# Patient Record
Sex: MALE | Race: WHITE | NOT HISPANIC OR LATINO | Employment: OTHER | URBAN - METROPOLITAN AREA
[De-identification: names, ages, dates, MRNs, and addresses within clinical notes are randomized per-mention and may not be internally consistent; named-entity substitution may affect disease eponyms.]

---

## 2020-02-07 NOTE — PROGRESS NOTES
Subjective:        [facility]  Cardiology Consult Note    Patient Identification:  Name: Ector Espino  Age: 59 y.o.  Sex: male  :  1960  MRN: [id number]             CC  AFIB    RECENT HOSP FOR [facility]OL[facility]    History of Present Illness:   59-year-old male who was recently admitted to the [facility] where the patient was admitted because of the alcohol intoxication, also was found to have the atrial fibrillation echocardiogram was performed, anticoagulations were offered but the patient denied here for the follow-up with no complaints of chest pain shortness of breath on exertion of a moderate degree at moderate exertion    Comorbid cardiac risk factors:     Past Medical History:  Past Medical History:   Diagnosis Date   • Atrial fibrillation (CMS/HCC)    • COPD (chronic obstructive pulmonary disease) (CMS/HCC)    • Hypertension      Past Surgical History:  History reviewed. No pertinent surgical history.   Allergies:  No Known Allergies  Home Meds:    (Not in a hospital admission)  Current Meds:   [unfilled]  Social History:   Social History     Tobacco Use   • Smoking status: Current Every Day Smoker   Substance Use Topics   • Alcohol use: Not Currently     Comment: quit 1 month ago      Family History:  History reviewed. No pertinent family history.     Review of Systems    Constitutional: No weakness,fatigue, fever, rigors, chills   Eyes: No vision changes, eye pain   ENT/oropharynx: No difficulty swallowing, sore throat, epistaxis, changes in hearing   Cardiovascular: No chest pain, chest tightness, palpitations, paroxysmal nocturnal dyspnea, orthopnea, diaphoresis, dizziness / syncopal episode   Respiratory:  Moderate shortness of breath, dyspnea on exertion, cough, wheezing hemoptysis   Gastrointestinal: No abdominal pain, nausea, vomiting, diarrhea, bloody stools   Genitourinary: No hematuria, dysuria   Neurological: No headache, tremors, numbness,  one-sided weakness     Musculoskeletal: No cramps, myalgias,  joint pain, joint swelling   Integument: No rash, edema           Constitutional:  Heart Rate:  [64] 64  BP: (131)/(91) 131/91    Physical Exam   General:  Appears in no acute distress  Eyes: PERTL,  HEENT:  No JVD. Thyroid not visibly enlarged. No mucosal pallor or cyanosis  Respiratory: Respirations regular and unlabored at rest. BBS with good air entry in all fields. No crackles, rubs or wheezes auscultated  Cardiovascular: S1S2 Regular rate and rhythm. No murmur, rub or gallop auscultated. No carotid bruits. DP/PT pulses    . No pretibial pitting edema  Gastrointestinal: Abdomen soft, flat, non tender. Bowel sounds present. No hepatosplenomegaly. No ascites  Musculoskeletal: FONTANA x4. No abnormal movements  Extremities: No digital clubbing or cyanosis  Skin: Color pink. Skin warm and dry to touch. No rashes  No xanthoma  Neuro: AAO x3 CN II-XII grossly intact            ECG 12 Lead  Date/Time: 2/13/2020 12:43 PM  Performed by: Kayce Cano MD  Authorized by: Kayce Cano MD   Comparison: not compared with previous ECG   Previous ECG: no previous ECG available  Rhythm: atrial fibrillation  ST Flattening: all    Clinical impression: non-specific ECG                Cardiographics  ECG:     Telemetry:    Echocardiogram:     Imaging  Chest X-ray:     Lab Review               @[email or web address]@            Summary   Technically difficult examination.   The ejection fraction 4 chamber is calculated at 55%.   Global left ventricular wall motion and contractility are within normal   limits.   Normal diastolic filling pattern.   Mild concentric left ventricular hypertrophy observed.   Mild dilation of the ascending aorta (3.9cm).   Trace tricuspid regurgitation.  Assessment:/ Recommendations / Plan:   Patient Active Problem List   Diagnosis   • Syncope and collapse   • Atrial fibrillation (CMS/HCC)   • Shortness of breath                    ICD-10-CM ICD-9-CM   1.  Syncope and collapse R55 780.2   2. Atrial fibrillation, unspecified type (CMS/HCC) I48.91 427.31   3. Shortness of breath R06.02 786.05     1. Syncope and collapse  Considering the patient's symptoms as well as clinical situation and  EKG findings, along with cardiac risk factors, ischemic workup is necessary to rule out ischemic cardiomyopathy, stress induced arrhythmias, and functional capacity for diagnosis as well as prognostic consideration    - Stress Test With Myocardial Perfusion One Day    2. Atrial fibrillation, unspecified type (CMS/HCC)  New onset of atrial fibrillation with a controlled rate patient does not want anticoagulation rate is under control will need ischemic work-up  - Stress Test With Myocardial Perfusion One Day    3. Shortness of breath  Probably due to multifactorial including atrial fibrillation and lack of exercise            CHADS-VASc Risk Assessment            1       Total Score        1 Hypertension        Criteria that do not apply:    CHF    Age >/= 75    DM    PRIOR STROKE/TIA/THROMBO    Vascular Disease    Age 65-74    Sex: Female        PT STILL DOES NOT WANT ANTICOAGULATIONS , DUE TO SIDE EFFECTS AND COST     JUST COMING OUT OF ALCIHOLISM    [facility]TH  Labs/tests ordered for am      Kayce Cano MD  2/13/2020, 12:38 PM      EMR Dragon/Transcription:   Dictated utilizing Dragon dictation

## 2020-02-13 ENCOUNTER — OFFICE VISIT (OUTPATIENT)
Dept: CARDIOLOGY | Facility: CLINIC | Age: 60
End: 2020-02-13

## 2020-02-13 VITALS
BODY MASS INDEX: 30.64 KG/M2 | HEART RATE: 64 BPM | DIASTOLIC BLOOD PRESSURE: 91 MMHG | HEIGHT: 70 IN | WEIGHT: 214 LBS | SYSTOLIC BLOOD PRESSURE: 131 MMHG

## 2020-02-13 DIAGNOSIS — I48.91 ATRIAL FIBRILLATION, UNSPECIFIED TYPE (HCC): ICD-10-CM

## 2020-02-13 DIAGNOSIS — R55 SYNCOPE AND COLLAPSE: Primary | ICD-10-CM

## 2020-02-13 DIAGNOSIS — R06.02 SHORTNESS OF BREATH: ICD-10-CM

## 2020-02-13 DIAGNOSIS — R94.39 ABNORMAL STRESS TEST: ICD-10-CM

## 2020-02-13 PROCEDURE — 99203 OFFICE O/P NEW LOW 30 MIN: CPT | Performed by: INTERNAL MEDICINE

## 2020-02-13 PROCEDURE — 93000 ELECTROCARDIOGRAM COMPLETE: CPT | Performed by: INTERNAL MEDICINE

## 2020-02-13 RX ORDER — MELOXICAM 15 MG/1
15 TABLET ORAL DAILY
COMMUNITY
Start: 2020-02-11

## 2020-02-13 RX ORDER — NICOTINE 21 MG/24HR
1 PATCH, TRANSDERMAL 24 HOURS TRANSDERMAL EVERY 24 HOURS
COMMUNITY
Start: 2020-01-29 | End: 2020-04-16

## 2020-02-13 RX ORDER — ASPIRIN 325 MG
325 TABLET, DELAYED RELEASE (ENTERIC COATED) ORAL DAILY
COMMUNITY
Start: 2018-02-23

## 2020-02-13 RX ORDER — FUROSEMIDE 40 MG/1
40 TABLET ORAL DAILY
COMMUNITY
Start: 2020-01-27 | End: 2020-04-16

## 2020-02-13 RX ORDER — ATENOLOL 25 MG/1
TABLET ORAL
COMMUNITY
Start: 2020-01-13

## 2020-02-13 RX ORDER — LEVOCETIRIZINE DIHYDROCHLORIDE 5 MG/1
TABLET, FILM COATED ORAL
COMMUNITY
Start: 2020-01-13

## 2020-02-13 RX ORDER — ALBUTEROL SULFATE 90 UG/1
2 AEROSOL, METERED RESPIRATORY (INHALATION)
COMMUNITY
Start: 2019-04-11 | End: 2020-04-10

## 2020-02-13 RX ORDER — LISINOPRIL 40 MG/1
40 TABLET ORAL DAILY
COMMUNITY
Start: 2020-02-04

## 2020-02-14 PROBLEM — R55 SYNCOPE AND COLLAPSE: Status: ACTIVE | Noted: 2020-02-14

## 2020-02-14 PROBLEM — R06.02 SHORTNESS OF BREATH: Status: ACTIVE | Noted: 2020-02-14

## 2020-02-14 PROBLEM — I48.91 ATRIAL FIBRILLATION (HCC): Status: ACTIVE | Noted: 2020-02-14

## 2020-02-24 ENCOUNTER — APPOINTMENT (OUTPATIENT)
Dept: CARDIOLOGY | Facility: HOSPITAL | Age: 60
End: 2020-02-24

## 2020-03-10 ENCOUNTER — APPOINTMENT (OUTPATIENT)
Dept: CARDIOLOGY | Facility: HOSPITAL | Age: 60
End: 2020-03-10

## 2020-03-18 ENCOUNTER — HOSPITAL ENCOUNTER (OUTPATIENT)
Dept: CARDIOLOGY | Facility: HOSPITAL | Age: 60
Discharge: HOME | End: 2020-03-18
Admitting: INTERNAL MEDICINE

## 2020-03-18 VITALS — HEART RATE: 58 BPM | SYSTOLIC BLOOD PRESSURE: 126 MMHG | DIASTOLIC BLOOD PRESSURE: 72 MMHG

## 2020-03-18 PROCEDURE — 93018 CV STRESS TEST I&R ONLY: CPT | Performed by: INTERNAL MEDICINE

## 2020-03-18 PROCEDURE — 93017 CV STRESS TEST TRACING ONLY: CPT

## 2020-03-18 PROCEDURE — 93016 CV STRESS TEST SUPVJ ONLY: CPT | Performed by: NURSE PRACTITIONER

## 2020-03-18 RX ORDER — NIFEDIPINE 30 MG/1
30 TABLET, EXTENDED RELEASE ORAL DAILY
COMMUNITY

## 2020-03-19 ENCOUNTER — TELEPHONE (OUTPATIENT)
Dept: CARDIOLOGY | Facility: CLINIC | Age: 60
End: 2020-03-19

## 2020-03-19 LAB
BH CV STRESS BP STAGE 1: NORMAL
BH CV STRESS DURATION MIN STAGE 1: 3
BH CV STRESS DURATION MIN STAGE 2: 1
BH CV STRESS DURATION SEC STAGE 1: 0
BH CV STRESS DURATION SEC STAGE 2: 14
BH CV STRESS GRADE STAGE 1: 10
BH CV STRESS GRADE STAGE 2: 10
BH CV STRESS HR STAGE 1: 105
BH CV STRESS HR STAGE 2: 114
BH CV STRESS METS STAGE 1: 4.6
BH CV STRESS METS STAGE 2: 7.5
BH CV STRESS PROTOCOL 1: NORMAL
BH CV STRESS RECOVERY BP: NORMAL MMHG
BH CV STRESS RECOVERY HR: 68 BPM
BH CV STRESS SPEED STAGE 1: 1.7
BH CV STRESS SPEED STAGE 2: 2.2
BH CV STRESS STAGE 1: 1
BH CV STRESS STAGE 2: 2
MAXIMAL PREDICTED HEART RATE: 163 BPM
PERCENT MAX PREDICTED HR: 71.17 %
STRESS BASELINE BP: NORMAL MMHG
STRESS BASELINE HR: 64 BPM
STRESS PERCENT HR: 84 %
STRESS POST ESTIMATED WORKLOAD: 5 METS
STRESS POST EXERCISE DUR MIN: 4 MIN
STRESS POST EXERCISE DUR SEC: 14 SEC
STRESS POST PEAK BP: NORMAL MMHG
STRESS POST PEAK HR: 116 BPM
STRESS TARGET HR: 139 BPM

## 2020-03-19 NOTE — TELEPHONE ENCOUNTER
LVM  abnormal TMT results per Dr Cano.   Need for further testing due to significant arrhythmias, dizziness, and submaximal exercise.    Further testing will include nuclear stress test.

## 2020-04-01 ENCOUNTER — HOSPITAL ENCOUNTER (OUTPATIENT)
Dept: CARDIOLOGY | Facility: HOSPITAL | Age: 60
Discharge: HOME | End: 2020-04-01

## 2020-04-01 ENCOUNTER — HOSPITAL ENCOUNTER (OUTPATIENT)
Dept: CARDIOLOGY | Facility: HOSPITAL | Age: 60
End: 2020-04-01

## 2020-04-01 DIAGNOSIS — R94.39 ABNORMAL STRESS TEST: ICD-10-CM

## 2020-04-01 DIAGNOSIS — R06.02 SHORTNESS OF BREATH: ICD-10-CM

## 2020-04-01 DIAGNOSIS — I48.91 ATRIAL FIBRILLATION, UNSPECIFIED TYPE (HCC): ICD-10-CM

## 2020-04-01 DIAGNOSIS — R55 SYNCOPE AND COLLAPSE: ICD-10-CM

## 2020-04-02 ENCOUNTER — HOSPITAL ENCOUNTER (OUTPATIENT)
Dept: CARDIOLOGY | Facility: HOSPITAL | Age: 60
Discharge: HOME | End: 2020-04-02

## 2020-04-02 VITALS
DIASTOLIC BLOOD PRESSURE: 80 MMHG | SYSTOLIC BLOOD PRESSURE: 130 MMHG | HEART RATE: 73 BPM | BODY MASS INDEX: 30.64 KG/M2 | HEIGHT: 70 IN | WEIGHT: 214 LBS | RESPIRATION RATE: 16 BRPM

## 2020-04-02 LAB
BH CV STRESS BP STAGE 1: NORMAL
BH CV STRESS COMMENTS STAGE 1: NORMAL
BH CV STRESS DOSE REGADENOSON STAGE 1: 0.4
BH CV STRESS DURATION MIN STAGE 1: 2
BH CV STRESS DURATION SEC STAGE 1: 57
BH CV STRESS GRADE STAGE 1: 0
BH CV STRESS HR STAGE 1: 104
BH CV STRESS METS STAGE 1: 1.4
BH CV STRESS PROTOCOL 1: NORMAL
BH CV STRESS RECOVERY BP: NORMAL MMHG
BH CV STRESS RECOVERY HR: 74 BPM
BH CV STRESS SPEED STAGE 1: 1
BH CV STRESS STAGE 1: 1
LV EF NUC BP: 60 %
MAXIMAL PREDICTED HEART RATE: 163 BPM
PERCENT MAX PREDICTED HR: 63.8 %
STRESS BASELINE BP: NORMAL MMHG
STRESS BASELINE HR: 59 BPM
STRESS PERCENT HR: 75 %
STRESS POST ESTIMATED WORKLOAD: 1.4 METS
STRESS POST EXERCISE DUR MIN: 2 MIN
STRESS POST EXERCISE DUR SEC: 57 SEC
STRESS POST PEAK BP: NORMAL MMHG
STRESS POST PEAK HR: 104 BPM
STRESS TARGET HR: 139 BPM

## 2020-04-02 PROCEDURE — 93018 CV STRESS TEST I&R ONLY: CPT | Performed by: INTERNAL MEDICINE

## 2020-04-02 PROCEDURE — 78452 HT MUSCLE IMAGE SPECT MULT: CPT

## 2020-04-02 PROCEDURE — 25010000002 REGADENOSON 0.4 MG/5ML SOLUTION: Performed by: INTERNAL MEDICINE

## 2020-04-02 PROCEDURE — 93017 CV STRESS TEST TRACING ONLY: CPT

## 2020-04-02 PROCEDURE — 78452 HT MUSCLE IMAGE SPECT MULT: CPT | Performed by: INTERNAL MEDICINE

## 2020-04-02 PROCEDURE — A9500 TC99M SESTAMIBI: HCPCS | Performed by: INTERNAL MEDICINE

## 2020-04-02 PROCEDURE — 93016 CV STRESS TEST SUPVJ ONLY: CPT | Performed by: NURSE PRACTITIONER

## 2020-04-02 PROCEDURE — 0 TECHNETIUM SESTAMIBI: Performed by: INTERNAL MEDICINE

## 2020-04-02 RX ADMIN — TECHNETIUM TC 99M SESTAMIBI 1 DOSE: 1 INJECTION INTRAVENOUS at 08:22

## 2020-04-02 RX ADMIN — TECHNETIUM TC 99M SESTAMIBI 1 DOSE: 1 INJECTION INTRAVENOUS at 10:00

## 2020-04-02 RX ADMIN — REGADENOSON 0.4 MG: 0.08 INJECTION, SOLUTION INTRAVENOUS at 10:00

## 2020-04-16 ENCOUNTER — OFFICE VISIT (OUTPATIENT)
Dept: CARDIOLOGY | Facility: CLINIC | Age: 60
End: 2020-04-16

## 2020-04-16 VITALS
HEIGHT: 70 IN | DIASTOLIC BLOOD PRESSURE: 88 MMHG | BODY MASS INDEX: 30.78 KG/M2 | HEART RATE: 63 BPM | SYSTOLIC BLOOD PRESSURE: 166 MMHG | WEIGHT: 215 LBS

## 2020-04-16 DIAGNOSIS — I48.91 ATRIAL FIBRILLATION, UNSPECIFIED TYPE (HCC): ICD-10-CM

## 2020-04-16 DIAGNOSIS — R06.02 SHORTNESS OF BREATH: ICD-10-CM

## 2020-04-16 DIAGNOSIS — R94.30 ABNORMAL CARDIAC FUNCTION TEST: Primary | ICD-10-CM

## 2020-04-16 PROCEDURE — 99442 PR PHYS/QHP TELEPHONE EVALUATION 11-20 MIN: CPT | Performed by: INTERNAL MEDICINE

## 2020-04-16 RX ORDER — NITROGLYCERIN 0.3 MG/1
TABLET SUBLINGUAL
Qty: 100 TABLET | Refills: 11 | Status: SHIPPED | OUTPATIENT
Start: 2020-04-16 | End: 2020-12-08 | Stop reason: SDUPTHER

## 2020-04-16 NOTE — PROGRESS NOTES
You have chosen to receive care through a telephone visit. Do you consent to use a telephone visit for your medical care today? Yes   Subjective:        Ector Espino is a 59 y.o. male who here for follow up    CC  Telephone conference only  HPI  59-year-old male with atrial fibrillation, shortness of breath recently underwent a stress test which is abnormal     Problem List Items Addressed This Visit        Cardiovascular and Mediastinum    Atrial fibrillation (CMS/HCC)    Relevant Medications    nitroglycerin (NITROSTAT) 0.3 MG SL tablet    Abnormal cardiac function test - Primary       Respiratory    Shortness of breath        .Interpretation Summary        · Findings consistent with a normal ECG stress test.  · Left ventricular ejection fraction is normal (Calculated EF = 60%).  · Myocardial perfusion imaging indicates a small-sized area of ischemia located in the anterior wall and inferior wall.  · Impressions are consistent with an intermediate risk study.         The following portions of the patient's history were reviewed and updated as appropriate: allergies, current medications, past family history, past medical history, past social history, past surgical history and problem list.    Past Medical History:   Diagnosis Date   • Asthma    • Atrial fibrillation (CMS/HCC)    • COPD (chronic obstructive pulmonary disease) (CMS/HCC)    • Hypertension      reports that he has been smoking cigarettes. He has smoked for the past 27.00 years. He has never used smokeless tobacco. He reports that he drank alcohol. He reports that he does not use drugs.   Family History   Problem Relation Age of Onset   • Diabetes Mother    • Stroke Mother        Review of Systems  Constitutional: No wt loss, fever, fatigue  Gastrointestinal: No nausea, abdominal pain  Behavioral/Psych: No insomnia or anxiety   Cardiovascular no chest pains or tightness in the chest  Objective:       Physical Exam  Telephone conference  only      Procedures      Echocardiogram:        Current Outpatient Medications:   •  aspirin  MG tablet, Take 325 mg by mouth Daily., Disp: , Rfl:   •  atenolol (TENORMIN) 25 MG tablet, TAKE 1 TABLET BY MOUTH EVERY DAY, Disp: , Rfl:   •  Fluticasone Furoate-Vilanterol (BREO ELLIPTA) 100-25 MCG/INH inhaler, Inhale 1 puff Daily., Disp: , Rfl:   •  levocetirizine (XYZAL) 5 MG tablet, TAKE 1 TABLET BY MOUTH EVERY DAY IN THE EVENING, Disp: , Rfl:   •  lisinopril (PRINIVIL,ZESTRIL) 40 MG tablet, Take 40 mg by mouth Daily., Disp: , Rfl:   •  meloxicam (MOBIC) 15 MG tablet, Take 15 mg by mouth Daily., Disp: , Rfl:   •  NIFEdipine XL (PROCARDIA XL) 30 MG 24 hr tablet, Take 30 mg by mouth Daily., Disp: , Rfl:    Assessment:        Patient Active Problem List   Diagnosis   • Syncope and collapse   • Atrial fibrillation (CMS/HCC)   • Shortness of breath     Summary   Technically difficult examination.   The ejection fraction 4 chamber is calculated at 55%.   Global left ventricular wall motion and contractility are within normal   limits.   Normal diastolic filling pattern.   Mild concentric left ventricular hypertrophy observed.   Mild dilation of the ascending aorta (3.9cm).   Trace tricuspid regurgitation.          Plan:            ICD-10-CM ICD-9-CM   1. Abnormal cardiac function test R94.30 794.30   2. Atrial fibrillation, unspecified type (CMS/HCC) I48.91 427.31   3. Shortness of breath R06.02 786.05     1. Abnormal cardiac function test  May need heart catheterization in the future    2. Atrial fibrillation, unspecified type (CMS/HCC)  Controlled    3. Shortness of breath  Multifactorial       Abnormal stress test    Patient has no chest pains or shortness of breath    Sublingual nitroglycerin has been prescribed    Avoid heart catheterization at this point with COVID-19    See us in 2 months, further work-up as needed    This patient has consented to a telehealth visit via telephone conference. The visit was  scheduled as a telephone conference visit to comply with patient safety concerns in accordance with CDC recommendations.  All vitals recorded within this visit are reported by the patient.  I spent  20 minutes in total including but not limited to the 20 minutes spent in direct conversation with this patient.   COUNSELING:    Ector VowelsCounseling was given to patient for the following topics: diagnostic results, risk factor reductions, impressions, risks and benefits of treatment options and importance of treatment compliance .       SMOKING COUNSELING:    [unfilled]    Dictated using Dragon dictation

## 2020-06-18 ENCOUNTER — OFFICE VISIT (OUTPATIENT)
Dept: CARDIOLOGY | Facility: CLINIC | Age: 60
End: 2020-06-18

## 2020-06-18 VITALS
SYSTOLIC BLOOD PRESSURE: 127 MMHG | HEART RATE: 48 BPM | WEIGHT: 215 LBS | DIASTOLIC BLOOD PRESSURE: 71 MMHG | HEIGHT: 70 IN | BODY MASS INDEX: 30.78 KG/M2

## 2020-06-18 DIAGNOSIS — I48.91 ATRIAL FIBRILLATION, UNSPECIFIED TYPE (HCC): Primary | ICD-10-CM

## 2020-06-18 DIAGNOSIS — E78.5 HYPERLIPIDEMIA, UNSPECIFIED HYPERLIPIDEMIA TYPE: ICD-10-CM

## 2020-06-18 DIAGNOSIS — R06.02 SHORTNESS OF BREATH: ICD-10-CM

## 2020-06-18 DIAGNOSIS — R94.30 ABNORMAL CARDIAC FUNCTION TEST: ICD-10-CM

## 2020-06-18 DIAGNOSIS — I10 ESSENTIAL HYPERTENSION: ICD-10-CM

## 2020-06-18 PROCEDURE — 99213 OFFICE O/P EST LOW 20 MIN: CPT | Performed by: INTERNAL MEDICINE

## 2020-06-18 PROCEDURE — 93000 ELECTROCARDIOGRAM COMPLETE: CPT | Performed by: INTERNAL MEDICINE

## 2020-06-18 RX ORDER — SIMVASTATIN 20 MG
20 TABLET ORAL
COMMUNITY
Start: 2020-06-09

## 2020-06-18 RX ORDER — NITROGLYCERIN 0.4 MG/1
TABLET SUBLINGUAL
Qty: 100 TABLET | Refills: 11 | Status: SHIPPED | OUTPATIENT
Start: 2020-06-18

## 2020-06-18 RX ORDER — ALBUTEROL SULFATE 90 UG/1
2 AEROSOL, METERED RESPIRATORY (INHALATION) EVERY 4 HOURS PRN
COMMUNITY
Start: 2020-06-08

## 2020-07-04 PROBLEM — I10 ESSENTIAL HYPERTENSION: Status: ACTIVE | Noted: 2020-07-04

## 2020-12-08 RX ORDER — TADALAFIL 5 MG/1
5 TABLET ORAL DAILY PRN
COMMUNITY

## 2020-12-09 ENCOUNTER — OFFICE VISIT (OUTPATIENT)
Dept: CARDIOLOGY | Facility: CLINIC | Age: 60
End: 2020-12-09

## 2020-12-09 VITALS
WEIGHT: 225 LBS | DIASTOLIC BLOOD PRESSURE: 80 MMHG | BODY MASS INDEX: 32.21 KG/M2 | HEIGHT: 70 IN | HEART RATE: 58 BPM | SYSTOLIC BLOOD PRESSURE: 132 MMHG

## 2020-12-09 DIAGNOSIS — E78.5 HYPERLIPIDEMIA, UNSPECIFIED HYPERLIPIDEMIA TYPE: Primary | ICD-10-CM

## 2020-12-09 DIAGNOSIS — I48.91 ATRIAL FIBRILLATION, UNSPECIFIED TYPE (HCC): ICD-10-CM

## 2020-12-09 DIAGNOSIS — I10 ESSENTIAL HYPERTENSION: ICD-10-CM

## 2020-12-09 DIAGNOSIS — R94.39 ABNORMAL STRESS TEST: ICD-10-CM

## 2020-12-09 PROCEDURE — 99442 PR PHYS/QHP TELEPHONE EVALUATION 11-20 MIN: CPT | Performed by: NURSE PRACTITIONER

## 2020-12-09 NOTE — PROGRESS NOTES
Subjective:        Butch Espino Jr. is a 60 y.o. male who here for follow up    No chief complaint on file.    Hypertension and atrial fibrillation    HPI  Butch Espino is a 60 year old male, who is new to me. He has a history which includes asthma, atrial fibrillation, and hypertension. His lipid panel on 9/9/20 showed , HDL 70, LDL 94, and triglycerides 47. His stress test on 4/2/20 indicated a small sized area of ischemia located in the anterior wall and inferior wall. His stress test on 3/18/20 equivocal ECG evidence of myocardial ischemia. The study was an indeterminate clinical evidence of myocardial ischemia. He denies chest pain, shortness of breath.      The following portions of the patient's history were reviewed and updated as appropriate: allergies, current medications, past family history, past medical history, past social history, past surgical history and problem list.    Past Medical History:   Diagnosis Date   • Asthma    • Atrial fibrillation (CMS/[facility])    • COPD (chronic obstructive pulmonary disease) (CMS/[facility])    • Hypertension          reports that he has been smoking cigarettes. He has smoked for the past 27.00 years. He has never used smokeless tobacco. He reports previous alcohol use. He reports that he does not use drugs.     Family History   Problem Relation Age of Onset   • Diabetes Mother    • Stroke Mother        ROS     Review of Systems  Constitutional: No wt loss, fever, fatigue  Gastrointestinal: No  nausea, abdominal pain  Behavioral/Psych: No insomnia or anxiety  Cardiovascular : Denies and chest pain. His shortness of breath is at his norm.      Objective:       This is a telephone visit and unable to do PE.    Physical Exam    Procedures   Interpretation Summary       · Findings consistent with a normal ECG stress test.  · Left ventricular ejection fraction is normal (Calculated EF = 60%).  · Myocardial perfusion imaging indicates a small-sized area of ischemia located in the  anterior wall and inferior wall.  · Impressions are consistent with an intermediate risk study.     Asymptomatic for chest pain. ECG is negative for ischemia.   Ectopy: rare PAC in recovery  B/P is appropriate for beta-blocker therapy. Baseline /80  Peak (post Lexiscan) 130/74  Recovery 128//65  Pharmacologic study due to inability to tolerate increasing speed and grade of treadmill due to c/o dizziness with exercise and Beta-blocker therapy.  Participated in Low Level exercise and tolerance is fair.   Supervised by:  Vannesa CORDOVA.          Interpretation Summary       · The patient reported dizziness during the stress test.  · Arrhythmias were significant during stress.  · Arrhythmias were significant during stress.  · Equivocal ECG evidence of myocardial ischemia.  · .Indeterminate clinical evidence of myocardial ischemia.  · Findings consistent with an equivocal ECG stress test.     Asymptomatic for chest pain, symptomatic for dizziness at peak exercise. Specificity of study reduced secondary to submaximal stress test due to patient inability to tolerate increasing speed and incline of treadmill.  Ectopy: Frequent PACs PVCs, rest, stress and recovery. Couplet at rest and at peak exercise. BIGEMINY noted in stress and recovery.   Blood pressure response:  appropriate. Exercise tolerance poor  Supervised by:  Vannesa CORDOVA.              Current Outpatient Medications:   •  albuterol sulfate  (90 Base) MCG/ACT inhaler, Inhale 2 puffs Every 4 (Four) Hours As Needed., Disp: , Rfl:   •  aspirin  MG tablet, Take 325 mg by mouth Daily., Disp: , Rfl:   •  atenolol (TENORMIN) 25 MG tablet, TAKE 1 TABLET BY MOUTH EVERY DAY, Disp: , Rfl:   •  levocetirizine (XYZAL) 5 MG tablet, TAKE 1 TABLET BY MOUTH EVERY DAY IN THE EVENING, Disp: , Rfl:   •  lisinopril (PRINIVIL,ZESTRIL) 40 MG tablet, Take 40 mg by mouth Daily., Disp: , Rfl:   •  meloxicam (MOBIC) 15 MG tablet, Take 15 mg by  mouth Daily., Disp: , Rfl:   •  NIFEdipine XL (PROCARDIA XL) 30 MG 24 hr tablet, Take 30 mg by mouth Daily., Disp: , Rfl:   •  nitroglycerin (NITROSTAT) 0.4 MG SL tablet, 1 under the tongue as needed for angina, may repeat q5mins for up three doses, Disp: 100 tablet, Rfl: 11  •  simvastatin (ZOCOR) 20 MG tablet, Take 20 mg by mouth every night at bedtime., Disp: , Rfl:   •  tadalafil (CIALIS) 5 MG tablet, Take 5 mg by mouth Daily As Needed., Disp: , Rfl:      Assessment:        Patient Active Problem List   Diagnosis   • Syncope and collapse   • Atrial fibrillation (CMS/HCC)   • Shortness of breath   • Abnormal cardiac function test   • Hyperlipidemia   • Essential hypertension               Plan:   1. Atrial fibrillation: Previous ischemic work up as above. Controlled.  Historically anticoagulated with asa 325 mg.     Pros and cons as well as indication of the anticoagulation has been explained to the patient in detail. There are no obvious complications at this stage. Risk of  the bleedings has been explained. Need for the regular blood workup and adjust the dose has been explained. Need for proper follow-up on anticoagulation also has been explained.        2. Hypertension: He states his blood pressure has been controlled on current medications. Continue atenolol, lisinopril, and nifedipine XL.    Educated patient on exercising for at least 30 minutes a day for 2 to 3 days a week. Importance of controlling hypertension and blood pressure checkup on the regular basis has been explained. Hypertension as a silent killer has been discussed. Risk reduction of the weight and regular exercises to control the hypertension has been explained.    The importance of not taking NSAIDs have been discussed.    3. Hyperlipidemia: lipid panel as above. His PCP manages his cholesterol. Continue statin.    Risk of the hyperlipidemia, importance of the treatment has been explained. Pros and cons of the statins has been explained.  Regular blood workup as well as side effects including the liver failure, myelopathy death has been explained.    4. Tobacco abuse: He states he is down to 1 pack a day and wants to continue try and quit smoking.    Butch Srinivas De Los Santos has been explained that tobacco abuse is extremely harmful to the body including to the cardiovascular, it significantly increases the risk of atherosclerosis, myocardial infarction, strokes and peripheral vascular disease  Strongly advised to stop tobacco abuse  Secondhand smoking also has been explained         5. Abnormal stress test in April: He states he has some shortness of breath, and no chest pain. He declines a  cardiac catheterization at this time.                  No diagnosis found.    There are no diagnoses linked to this encounter.    COUNSELING: done    Butch sEpino Jr.Counseling was given to patient for the following topics: diagnostic results, risk factor reductions, impressions, risks and benefits of treatment options and importance of treatment compliance .       SMOKING COUNSELING:  denies     He will follow up in 6 months, unless he needs to be seen sooner.    Sincerely,   TASHA Britt  [facility]  12/09/20  09:37 EST    This patient has consented to a telehealth visit via telephone. The visit was scheduled as a telephone visit to comply with patient safety concerns in accordance with CDC recommendations.  All vitals recorded within this visit are reported by the patient.  I spent 15 minutes in total including but not limited to the 11 minutes spent in direct conversation with this patient.